# Patient Record
Sex: FEMALE | Race: WHITE | NOT HISPANIC OR LATINO | URBAN - METROPOLITAN AREA
[De-identification: names, ages, dates, MRNs, and addresses within clinical notes are randomized per-mention and may not be internally consistent; named-entity substitution may affect disease eponyms.]

---

## 2023-12-10 VITALS
SYSTOLIC BLOOD PRESSURE: 126 MMHG | WEIGHT: 130.07 LBS | RESPIRATION RATE: 18 BRPM | TEMPERATURE: 98 F | OXYGEN SATURATION: 98 % | HEART RATE: 87 BPM | DIASTOLIC BLOOD PRESSURE: 88 MMHG

## 2023-12-10 LAB
ANION GAP SERPL CALC-SCNC: 11 MMOL/L — SIGNIFICANT CHANGE UP (ref 5–17)
ANION GAP SERPL CALC-SCNC: 11 MMOL/L — SIGNIFICANT CHANGE UP (ref 5–17)
BASE EXCESS BLDV CALC-SCNC: 5.8 MMOL/L — HIGH (ref -2–3)
BASE EXCESS BLDV CALC-SCNC: 5.8 MMOL/L — HIGH (ref -2–3)
BASOPHILS # BLD AUTO: 0.02 K/UL — SIGNIFICANT CHANGE UP (ref 0–0.2)
BASOPHILS # BLD AUTO: 0.02 K/UL — SIGNIFICANT CHANGE UP (ref 0–0.2)
BASOPHILS NFR BLD AUTO: 0.4 % — SIGNIFICANT CHANGE UP (ref 0–2)
BASOPHILS NFR BLD AUTO: 0.4 % — SIGNIFICANT CHANGE UP (ref 0–2)
BUN SERPL-MCNC: 9 MG/DL — SIGNIFICANT CHANGE UP (ref 7–23)
BUN SERPL-MCNC: 9 MG/DL — SIGNIFICANT CHANGE UP (ref 7–23)
CA-I SERPL-SCNC: 1.14 MMOL/L — LOW (ref 1.15–1.33)
CA-I SERPL-SCNC: 1.14 MMOL/L — LOW (ref 1.15–1.33)
CALCIUM SERPL-MCNC: 9.2 MG/DL — SIGNIFICANT CHANGE UP (ref 8.4–10.5)
CALCIUM SERPL-MCNC: 9.2 MG/DL — SIGNIFICANT CHANGE UP (ref 8.4–10.5)
CHLORIDE SERPL-SCNC: 92 MMOL/L — LOW (ref 96–108)
CHLORIDE SERPL-SCNC: 92 MMOL/L — LOW (ref 96–108)
CO2 BLDV-SCNC: 33.2 MMOL/L — HIGH (ref 22–26)
CO2 BLDV-SCNC: 33.2 MMOL/L — HIGH (ref 22–26)
CO2 SERPL-SCNC: 27 MMOL/L — SIGNIFICANT CHANGE UP (ref 22–31)
CO2 SERPL-SCNC: 27 MMOL/L — SIGNIFICANT CHANGE UP (ref 22–31)
CREAT SERPL-MCNC: 0.66 MG/DL — SIGNIFICANT CHANGE UP (ref 0.5–1.3)
CREAT SERPL-MCNC: 0.66 MG/DL — SIGNIFICANT CHANGE UP (ref 0.5–1.3)
D DIMER BLD IA.RAPID-MCNC: 412 NG/ML DDU — HIGH
D DIMER BLD IA.RAPID-MCNC: 412 NG/ML DDU — HIGH
EGFR: 93 ML/MIN/1.73M2 — SIGNIFICANT CHANGE UP
EGFR: 93 ML/MIN/1.73M2 — SIGNIFICANT CHANGE UP
EOSINOPHIL # BLD AUTO: 0.16 K/UL — SIGNIFICANT CHANGE UP (ref 0–0.5)
EOSINOPHIL # BLD AUTO: 0.16 K/UL — SIGNIFICANT CHANGE UP (ref 0–0.5)
EOSINOPHIL NFR BLD AUTO: 2.8 % — SIGNIFICANT CHANGE UP (ref 0–6)
EOSINOPHIL NFR BLD AUTO: 2.8 % — SIGNIFICANT CHANGE UP (ref 0–6)
FLUAV AG NPH QL: DETECTED
FLUAV AG NPH QL: DETECTED
FLUBV AG NPH QL: SIGNIFICANT CHANGE UP
FLUBV AG NPH QL: SIGNIFICANT CHANGE UP
GAS PNL BLDV: 126 MMOL/L — LOW (ref 136–145)
GAS PNL BLDV: 126 MMOL/L — LOW (ref 136–145)
GAS PNL BLDV: SIGNIFICANT CHANGE UP
GLUCOSE SERPL-MCNC: 104 MG/DL — HIGH (ref 70–99)
GLUCOSE SERPL-MCNC: 104 MG/DL — HIGH (ref 70–99)
HCO3 BLDV-SCNC: 32 MMOL/L — HIGH (ref 22–29)
HCO3 BLDV-SCNC: 32 MMOL/L — HIGH (ref 22–29)
HCT VFR BLD CALC: 35.7 % — SIGNIFICANT CHANGE UP (ref 34.5–45)
HCT VFR BLD CALC: 35.7 % — SIGNIFICANT CHANGE UP (ref 34.5–45)
HGB BLD-MCNC: 12.4 G/DL — SIGNIFICANT CHANGE UP (ref 11.5–15.5)
HGB BLD-MCNC: 12.4 G/DL — SIGNIFICANT CHANGE UP (ref 11.5–15.5)
IMM GRANULOCYTES NFR BLD AUTO: 0.4 % — SIGNIFICANT CHANGE UP (ref 0–0.9)
IMM GRANULOCYTES NFR BLD AUTO: 0.4 % — SIGNIFICANT CHANGE UP (ref 0–0.9)
LYMPHOCYTES # BLD AUTO: 0.56 K/UL — LOW (ref 1–3.3)
LYMPHOCYTES # BLD AUTO: 0.56 K/UL — LOW (ref 1–3.3)
LYMPHOCYTES # BLD AUTO: 9.9 % — LOW (ref 13–44)
LYMPHOCYTES # BLD AUTO: 9.9 % — LOW (ref 13–44)
MCHC RBC-ENTMCNC: 32.3 PG — SIGNIFICANT CHANGE UP (ref 27–34)
MCHC RBC-ENTMCNC: 32.3 PG — SIGNIFICANT CHANGE UP (ref 27–34)
MCHC RBC-ENTMCNC: 34.7 GM/DL — SIGNIFICANT CHANGE UP (ref 32–36)
MCHC RBC-ENTMCNC: 34.7 GM/DL — SIGNIFICANT CHANGE UP (ref 32–36)
MCV RBC AUTO: 93 FL — SIGNIFICANT CHANGE UP (ref 80–100)
MCV RBC AUTO: 93 FL — SIGNIFICANT CHANGE UP (ref 80–100)
MONOCYTES # BLD AUTO: 0.68 K/UL — SIGNIFICANT CHANGE UP (ref 0–0.9)
MONOCYTES # BLD AUTO: 0.68 K/UL — SIGNIFICANT CHANGE UP (ref 0–0.9)
MONOCYTES NFR BLD AUTO: 12 % — SIGNIFICANT CHANGE UP (ref 2–14)
MONOCYTES NFR BLD AUTO: 12 % — SIGNIFICANT CHANGE UP (ref 2–14)
NEUTROPHILS # BLD AUTO: 4.22 K/UL — SIGNIFICANT CHANGE UP (ref 1.8–7.4)
NEUTROPHILS # BLD AUTO: 4.22 K/UL — SIGNIFICANT CHANGE UP (ref 1.8–7.4)
NEUTROPHILS NFR BLD AUTO: 74.5 % — SIGNIFICANT CHANGE UP (ref 43–77)
NEUTROPHILS NFR BLD AUTO: 74.5 % — SIGNIFICANT CHANGE UP (ref 43–77)
NRBC # BLD: 0 /100 WBCS — SIGNIFICANT CHANGE UP (ref 0–0)
NRBC # BLD: 0 /100 WBCS — SIGNIFICANT CHANGE UP (ref 0–0)
NT-PROBNP SERPL-SCNC: 290 PG/ML — SIGNIFICANT CHANGE UP (ref 0–300)
NT-PROBNP SERPL-SCNC: 290 PG/ML — SIGNIFICANT CHANGE UP (ref 0–300)
PCO2 BLDV: 50 MMHG — HIGH (ref 39–42)
PCO2 BLDV: 50 MMHG — HIGH (ref 39–42)
PH BLDV: 7.41 — SIGNIFICANT CHANGE UP (ref 7.32–7.43)
PH BLDV: 7.41 — SIGNIFICANT CHANGE UP (ref 7.32–7.43)
PLATELET # BLD AUTO: 351 K/UL — SIGNIFICANT CHANGE UP (ref 150–400)
PLATELET # BLD AUTO: 351 K/UL — SIGNIFICANT CHANGE UP (ref 150–400)
PO2 BLDV: <33 MMHG — SIGNIFICANT CHANGE UP (ref 25–45)
PO2 BLDV: <33 MMHG — SIGNIFICANT CHANGE UP (ref 25–45)
POTASSIUM BLDV-SCNC: 4.3 MMOL/L — SIGNIFICANT CHANGE UP (ref 3.5–5.1)
POTASSIUM BLDV-SCNC: 4.3 MMOL/L — SIGNIFICANT CHANGE UP (ref 3.5–5.1)
POTASSIUM SERPL-MCNC: 4.2 MMOL/L — SIGNIFICANT CHANGE UP (ref 3.5–5.3)
POTASSIUM SERPL-MCNC: 4.2 MMOL/L — SIGNIFICANT CHANGE UP (ref 3.5–5.3)
POTASSIUM SERPL-SCNC: 4.2 MMOL/L — SIGNIFICANT CHANGE UP (ref 3.5–5.3)
POTASSIUM SERPL-SCNC: 4.2 MMOL/L — SIGNIFICANT CHANGE UP (ref 3.5–5.3)
RBC # BLD: 3.84 M/UL — SIGNIFICANT CHANGE UP (ref 3.8–5.2)
RBC # BLD: 3.84 M/UL — SIGNIFICANT CHANGE UP (ref 3.8–5.2)
RBC # FLD: 12.6 % — SIGNIFICANT CHANGE UP (ref 10.3–14.5)
RBC # FLD: 12.6 % — SIGNIFICANT CHANGE UP (ref 10.3–14.5)
RSV RNA NPH QL NAA+NON-PROBE: SIGNIFICANT CHANGE UP
RSV RNA NPH QL NAA+NON-PROBE: SIGNIFICANT CHANGE UP
SAO2 % BLDV: 24.3 % — LOW (ref 67–88)
SAO2 % BLDV: 24.3 % — LOW (ref 67–88)
SARS-COV-2 RNA SPEC QL NAA+PROBE: SIGNIFICANT CHANGE UP
SARS-COV-2 RNA SPEC QL NAA+PROBE: SIGNIFICANT CHANGE UP
SODIUM SERPL-SCNC: 130 MMOL/L — LOW (ref 135–145)
SODIUM SERPL-SCNC: 130 MMOL/L — LOW (ref 135–145)
TROPONIN T, HIGH SENSITIVITY RESULT: 7 NG/L — SIGNIFICANT CHANGE UP (ref 0–51)
TROPONIN T, HIGH SENSITIVITY RESULT: 7 NG/L — SIGNIFICANT CHANGE UP (ref 0–51)
WBC # BLD: 5.66 K/UL — SIGNIFICANT CHANGE UP (ref 3.8–10.5)
WBC # BLD: 5.66 K/UL — SIGNIFICANT CHANGE UP (ref 3.8–10.5)
WBC # FLD AUTO: 5.66 K/UL — SIGNIFICANT CHANGE UP (ref 3.8–10.5)
WBC # FLD AUTO: 5.66 K/UL — SIGNIFICANT CHANGE UP (ref 3.8–10.5)

## 2023-12-10 PROCEDURE — 99285 EMERGENCY DEPT VISIT HI MDM: CPT

## 2023-12-10 PROCEDURE — G1004: CPT

## 2023-12-10 PROCEDURE — 71275 CT ANGIOGRAPHY CHEST: CPT | Mod: 26,ME

## 2023-12-10 PROCEDURE — 71046 X-RAY EXAM CHEST 2 VIEWS: CPT | Mod: 26

## 2023-12-10 RX ORDER — PIPERACILLIN AND TAZOBACTAM 4; .5 G/20ML; G/20ML
3.38 INJECTION, POWDER, LYOPHILIZED, FOR SOLUTION INTRAVENOUS ONCE
Refills: 0 | Status: COMPLETED | OUTPATIENT
Start: 2023-12-10 | End: 2023-12-10

## 2023-12-10 RX ORDER — IPRATROPIUM/ALBUTEROL SULFATE 18-103MCG
3 AEROSOL WITH ADAPTER (GRAM) INHALATION ONCE
Refills: 0 | Status: COMPLETED | OUTPATIENT
Start: 2023-12-10 | End: 2023-12-10

## 2023-12-10 RX ORDER — VANCOMYCIN HCL 1 G
1000 VIAL (EA) INTRAVENOUS ONCE
Refills: 0 | Status: COMPLETED | OUTPATIENT
Start: 2023-12-10 | End: 2023-12-10

## 2023-12-10 RX ORDER — SODIUM CHLORIDE 9 MG/ML
1000 INJECTION INTRAMUSCULAR; INTRAVENOUS; SUBCUTANEOUS ONCE
Refills: 0 | Status: COMPLETED | OUTPATIENT
Start: 2023-12-10 | End: 2023-12-10

## 2023-12-10 RX ADMIN — Medication 250 MILLIGRAM(S): at 23:58

## 2023-12-10 RX ADMIN — Medication 3 MILLILITER(S): at 20:14

## 2023-12-10 RX ADMIN — SODIUM CHLORIDE 1000 MILLILITER(S): 9 INJECTION INTRAMUSCULAR; INTRAVENOUS; SUBCUTANEOUS at 20:52

## 2023-12-10 NOTE — ED PROVIDER NOTE - OBJECTIVE STATEMENT
74 yo fem without signif pmhx visiting from [place]; came to [place] via [place] apx 3 weeks ago; in past 2 weeks developed cough productive of yellow-green sputum 74 yo fem without signif pmhx visiting from [place]; came to [place] via [place] apx 3 weeks ago; in past 2 weeks developed cough productive of yellow-green sputum 74 yo fem without signif pmhx visiting from [place]; came to [place] via [place] apx 3 weeks ago; in past 2 weeks developed cough productive of yellow-green sputum; seen 1 week ago at [facility] where CXR showed: hyperinflation, ? "nodularity infiltrates"in RLL that could represent atypical pneumonia, bronchial thickening; no focal consolidation, effusion or ptx. tortuous ectatic aorta, degen spine change, pectus excavatum, 5 mm AYANNA nodule; CT scan was recommended upon her return to [place]. She has been on amoxicillin and azithromycin since then. Returned to [facility] today due to persistent symptoms, and increasing fatigue, more painful cough; having difficulty expectorating sputum in the past day.   states he can hear rattling from her chest at night in bed. Today at [facility] she reports having a fever of 102, and she tested positive for influenza A; she was referred to the ED due to hypoxia to 91%.  No hx of DVT/PE, heart disease, asthma/COPD/other chronic lung disease, HTN, HLD, DM.    PMHx denies  Meds amoxicillin, azithromycin  NKDA 72 yo fem without signif pmhx visiting from [place]; [place] via [place] ap[place] 3 weeks ago; in past 2 weeks developed cough productive of yellow-green sputum; seen 1 week ago at [facility] where CXR showed: hyperinflation, ? "nodularity infiltrates"in RLL that could represent atypical pneumonia, bronchial thickening; no focal consolidation, effusion or ptx. tortuous ectatic aorta, degen spine change, pectus excavatum, 5 mm AYANNA nodule; CT scan was recommended upon her return to [place]. She has been on amoxicillin and azithromycin since then. Returned to [facility] today due to persistent symptoms, and increasing fatigue, more painful cough; having difficulty expectorating sputum in the past day.   states he can hear rattling from her chest at night in bed. Today at [facility] she reports having a fever of 102, and she tested positive for influenza A; she was referred to the ED due to hypoxia to 91%.  No hx of DVT/PE, heart disease, asthma/COPD/other chronic lung disease, HTN, HLD, DM.    PMHx denies  Meds amoxicillin, azithromycin  NKDA

## 2023-12-10 NOTE — ED PROVIDER NOTE - NS ED ATTENDING STATEMENT MOD
This was a shared visit with the JASPAL. I reviewed and verified the documentation and independently performed the documented:

## 2023-12-10 NOTE — ED ADULT TRIAGE NOTE - CHIEF COMPLAINT QUOTE
Pt co productive cough x2 weeks. Finished course of PO Amoxicillin and Azithromycin without relief of sx.

## 2023-12-10 NOTE — ED ADULT NURSE REASSESSMENT NOTE - NS ED NURSE REASSESS COMMENT FT1
pt received alert and oriented x4, breathing at ease on room air, no complaints at this time, no distress noted, pt medicated as per order, ongoing monitoring

## 2023-12-10 NOTE — ED ADULT NURSE NOTE - NSFALLUNIVINTERV_ED_ALL_ED
Bed/Stretcher in lowest position, wheels locked, appropriate side rails in place/Call bell, personal items and telephone in reach/Instruct patient to call for assistance before getting out of bed/chair/stretcher/Non-slip footwear applied when patient is off stretcher/[place] to call system/Physically safe environment - no spills, clutter or unnecessary equipment/Purposeful proactive rounding/Room/bathroom lighting operational, light cord in reach Bed/Stretcher in lowest position, wheels locked, appropriate side rails in place/Call bell, personal items and telephone in reach/Instruct patient to call for assistance before getting out of bed/chair/stretcher/Non-slip footwear applied when patient is off stretcher/[place] to call system/Physically safe environment - no spills, clutter or unnecessary equipment/Purposeful proactive rounding/Room/bathroom lighting operational, light cord in reach

## 2023-12-10 NOTE — ED PROVIDER NOTE - CLINICAL SUMMARY MEDICAL DECISION MAKING FREE TEXT BOX
2 weeks cough, now with fever and CISNEROS. Fever 102 earlier today. Tested + for Flu A.  Mildly hypoxic to 91-93% on room air.  HD stable.  Tx empirically with zosyn & vanco as not improving with PO abx.  Imaging in ED significant for LLL bronchus obstruction, with atelectasis of majority of LLL, consider mucous plug vs neoplasm.  Admit to medicine.

## 2023-12-10 NOTE — ED PROVIDER NOTE - NS ED MD DISPO ISOLATION TYPES
5-year-old female with a history of wheezing here for increased work of breathing.  RSS of 9.  We will give 3 treatments, Decadron and reassess
Droplet

## 2023-12-10 NOTE — ED PROVIDER NOTE - PHYSICAL EXAMINATION
CONSTITUTIONAL: NAD   SKIN: Normal color and turgor.    HEAD: NC/AT.  EYES: Conjunctiva clear. Anicteric sclera.  ENT: Airway clear. Normal voice.   RESPIRATORY:  Normal work of breathing. Lungs CTAB.  CARDIOVASCULAR:  RRR, S1S2. No M/R/G.      GI:  Abdomen soft, nontender.    MSK: Neck supple.  No LE edema or calf tenderness. No joint swelling or ROM limitation.  NEURO: Alert; CN: grossly intact. Speech clear.  MCCONNELL. Gait steady.    Ambulatory O2 sat in ED: 91-93% on room air

## 2023-12-10 NOTE — ED ADULT NURSE NOTE - OBJECTIVE STATEMENT
72 y/o F presents to ED, c/o productive cough with green/yellow sputum for approx 2 weeks. 74 y/o F presents to ED, c/o productive cough with green/yellow sputum for approx 2 weeks.

## 2023-12-11 ENCOUNTER — INPATIENT (INPATIENT)
Facility: HOSPITAL | Age: 73
LOS: 0 days | Discharge: HOME | DRG: 194 | End: 2023-12-12
Attending: INTERNAL MEDICINE | Admitting: STUDENT IN AN ORGANIZED HEALTH CARE EDUCATION/TRAINING PROGRAM
Payer: COMMERCIAL

## 2023-12-11 DIAGNOSIS — E87.1 HYPO-OSMOLALITY AND HYPONATREMIA: ICD-10-CM

## 2023-12-11 DIAGNOSIS — Z29.9 ENCOUNTER FOR PROPHYLACTIC MEASURES, UNSPECIFIED: ICD-10-CM

## 2023-12-11 DIAGNOSIS — J18.9 PNEUMONIA, UNSPECIFIED ORGANISM: ICD-10-CM

## 2023-12-11 DIAGNOSIS — J10.1 INFLUENZA DUE TO OTHER IDENTIFIED INFLUENZA VIRUS WITH OTHER RESPIRATORY MANIFESTATIONS: ICD-10-CM

## 2023-12-11 LAB
ALBUMIN SERPL ELPH-MCNC: 3.2 G/DL — LOW (ref 3.3–5)
ALBUMIN SERPL ELPH-MCNC: 3.2 G/DL — LOW (ref 3.3–5)
ALBUMIN SERPL ELPH-MCNC: 3.7 G/DL — SIGNIFICANT CHANGE UP (ref 3.3–5)
ALBUMIN SERPL ELPH-MCNC: 3.7 G/DL — SIGNIFICANT CHANGE UP (ref 3.3–5)
ALP SERPL-CCNC: 66 U/L — SIGNIFICANT CHANGE UP (ref 40–120)
ALP SERPL-CCNC: 66 U/L — SIGNIFICANT CHANGE UP (ref 40–120)
ALP SERPL-CCNC: 74 U/L — SIGNIFICANT CHANGE UP (ref 40–120)
ALP SERPL-CCNC: 74 U/L — SIGNIFICANT CHANGE UP (ref 40–120)
ALT FLD-CCNC: 12 U/L — SIGNIFICANT CHANGE UP (ref 10–45)
ALT FLD-CCNC: 12 U/L — SIGNIFICANT CHANGE UP (ref 10–45)
ALT FLD-CCNC: 8 U/L — LOW (ref 10–45)
ALT FLD-CCNC: 8 U/L — LOW (ref 10–45)
ANION GAP SERPL CALC-SCNC: 9 MMOL/L — SIGNIFICANT CHANGE UP (ref 5–17)
ANION GAP SERPL CALC-SCNC: 9 MMOL/L — SIGNIFICANT CHANGE UP (ref 5–17)
APTT BLD: 35.7 SEC — HIGH (ref 24.5–35.6)
APTT BLD: 35.7 SEC — HIGH (ref 24.5–35.6)
AST SERPL-CCNC: 24 U/L — SIGNIFICANT CHANGE UP (ref 10–40)
AST SERPL-CCNC: 24 U/L — SIGNIFICANT CHANGE UP (ref 10–40)
AST SERPL-CCNC: 33 U/L — SIGNIFICANT CHANGE UP (ref 10–40)
AST SERPL-CCNC: 33 U/L — SIGNIFICANT CHANGE UP (ref 10–40)
BASOPHILS # BLD AUTO: 0.02 K/UL — SIGNIFICANT CHANGE UP (ref 0–0.2)
BASOPHILS # BLD AUTO: 0.02 K/UL — SIGNIFICANT CHANGE UP (ref 0–0.2)
BASOPHILS NFR BLD AUTO: 0.5 % — SIGNIFICANT CHANGE UP (ref 0–2)
BASOPHILS NFR BLD AUTO: 0.5 % — SIGNIFICANT CHANGE UP (ref 0–2)
BILIRUB DIRECT SERPL-MCNC: <0.2 MG/DL — SIGNIFICANT CHANGE UP (ref 0–0.3)
BILIRUB DIRECT SERPL-MCNC: <0.2 MG/DL — SIGNIFICANT CHANGE UP (ref 0–0.3)
BILIRUB INDIRECT FLD-MCNC: SIGNIFICANT CHANGE UP MG/DL (ref 0.2–1)
BILIRUB INDIRECT FLD-MCNC: SIGNIFICANT CHANGE UP MG/DL (ref 0.2–1)
BILIRUB SERPL-MCNC: 0.2 MG/DL — SIGNIFICANT CHANGE UP (ref 0.2–1.2)
BILIRUB SERPL-MCNC: 0.2 MG/DL — SIGNIFICANT CHANGE UP (ref 0.2–1.2)
BILIRUB SERPL-MCNC: 0.3 MG/DL — SIGNIFICANT CHANGE UP (ref 0.2–1.2)
BILIRUB SERPL-MCNC: 0.3 MG/DL — SIGNIFICANT CHANGE UP (ref 0.2–1.2)
BUN SERPL-MCNC: 6 MG/DL — LOW (ref 7–23)
BUN SERPL-MCNC: 6 MG/DL — LOW (ref 7–23)
CALCIUM SERPL-MCNC: 8.8 MG/DL — SIGNIFICANT CHANGE UP (ref 8.4–10.5)
CALCIUM SERPL-MCNC: 8.8 MG/DL — SIGNIFICANT CHANGE UP (ref 8.4–10.5)
CHLORIDE SERPL-SCNC: 96 MMOL/L — SIGNIFICANT CHANGE UP (ref 96–108)
CHLORIDE SERPL-SCNC: 96 MMOL/L — SIGNIFICANT CHANGE UP (ref 96–108)
CO2 SERPL-SCNC: 27 MMOL/L — SIGNIFICANT CHANGE UP (ref 22–31)
CO2 SERPL-SCNC: 27 MMOL/L — SIGNIFICANT CHANGE UP (ref 22–31)
CREAT SERPL-MCNC: 0.52 MG/DL — SIGNIFICANT CHANGE UP (ref 0.5–1.3)
CREAT SERPL-MCNC: 0.52 MG/DL — SIGNIFICANT CHANGE UP (ref 0.5–1.3)
EGFR: 99 ML/MIN/1.73M2 — SIGNIFICANT CHANGE UP
EGFR: 99 ML/MIN/1.73M2 — SIGNIFICANT CHANGE UP
EOSINOPHIL # BLD AUTO: 0 K/UL — SIGNIFICANT CHANGE UP (ref 0–0.5)
EOSINOPHIL # BLD AUTO: 0 K/UL — SIGNIFICANT CHANGE UP (ref 0–0.5)
EOSINOPHIL NFR BLD AUTO: 0 % — SIGNIFICANT CHANGE UP (ref 0–6)
EOSINOPHIL NFR BLD AUTO: 0 % — SIGNIFICANT CHANGE UP (ref 0–6)
FLUAV H1 2009 PAND RNA SPEC QL NAA+PROBE: DETECTED
FLUAV H1 2009 PAND RNA SPEC QL NAA+PROBE: DETECTED
GLUCOSE SERPL-MCNC: 96 MG/DL — SIGNIFICANT CHANGE UP (ref 70–99)
GLUCOSE SERPL-MCNC: 96 MG/DL — SIGNIFICANT CHANGE UP (ref 70–99)
HCT VFR BLD CALC: 35.1 % — SIGNIFICANT CHANGE UP (ref 34.5–45)
HCT VFR BLD CALC: 35.1 % — SIGNIFICANT CHANGE UP (ref 34.5–45)
HCV AB S/CO SERPL IA: 0.08 S/CO — SIGNIFICANT CHANGE UP
HCV AB S/CO SERPL IA: 0.08 S/CO — SIGNIFICANT CHANGE UP
HCV AB SERPL-IMP: SIGNIFICANT CHANGE UP
HCV AB SERPL-IMP: SIGNIFICANT CHANGE UP
HGB BLD-MCNC: 11.8 G/DL — SIGNIFICANT CHANGE UP (ref 11.5–15.5)
HGB BLD-MCNC: 11.8 G/DL — SIGNIFICANT CHANGE UP (ref 11.5–15.5)
IMM GRANULOCYTES NFR BLD AUTO: 0.5 % — SIGNIFICANT CHANGE UP (ref 0–0.9)
IMM GRANULOCYTES NFR BLD AUTO: 0.5 % — SIGNIFICANT CHANGE UP (ref 0–0.9)
INR BLD: 1.18 — SIGNIFICANT CHANGE UP (ref 0.85–1.18)
INR BLD: 1.18 — SIGNIFICANT CHANGE UP (ref 0.85–1.18)
LYMPHOCYTES # BLD AUTO: 0.71 K/UL — LOW (ref 1–3.3)
LYMPHOCYTES # BLD AUTO: 0.71 K/UL — LOW (ref 1–3.3)
LYMPHOCYTES # BLD AUTO: 16.9 % — SIGNIFICANT CHANGE UP (ref 13–44)
LYMPHOCYTES # BLD AUTO: 16.9 % — SIGNIFICANT CHANGE UP (ref 13–44)
MAGNESIUM SERPL-MCNC: 2 MG/DL — SIGNIFICANT CHANGE UP (ref 1.6–2.6)
MAGNESIUM SERPL-MCNC: 2 MG/DL — SIGNIFICANT CHANGE UP (ref 1.6–2.6)
MCHC RBC-ENTMCNC: 32 PG — SIGNIFICANT CHANGE UP (ref 27–34)
MCHC RBC-ENTMCNC: 32 PG — SIGNIFICANT CHANGE UP (ref 27–34)
MCHC RBC-ENTMCNC: 33.6 GM/DL — SIGNIFICANT CHANGE UP (ref 32–36)
MCHC RBC-ENTMCNC: 33.6 GM/DL — SIGNIFICANT CHANGE UP (ref 32–36)
MCV RBC AUTO: 95.1 FL — SIGNIFICANT CHANGE UP (ref 80–100)
MCV RBC AUTO: 95.1 FL — SIGNIFICANT CHANGE UP (ref 80–100)
MONOCYTES # BLD AUTO: 0.58 K/UL — SIGNIFICANT CHANGE UP (ref 0–0.9)
MONOCYTES # BLD AUTO: 0.58 K/UL — SIGNIFICANT CHANGE UP (ref 0–0.9)
MONOCYTES NFR BLD AUTO: 13.8 % — SIGNIFICANT CHANGE UP (ref 2–14)
MONOCYTES NFR BLD AUTO: 13.8 % — SIGNIFICANT CHANGE UP (ref 2–14)
NEUTROPHILS # BLD AUTO: 2.87 K/UL — SIGNIFICANT CHANGE UP (ref 1.8–7.4)
NEUTROPHILS # BLD AUTO: 2.87 K/UL — SIGNIFICANT CHANGE UP (ref 1.8–7.4)
NEUTROPHILS NFR BLD AUTO: 68.3 % — SIGNIFICANT CHANGE UP (ref 43–77)
NEUTROPHILS NFR BLD AUTO: 68.3 % — SIGNIFICANT CHANGE UP (ref 43–77)
NRBC # BLD: 0 /100 WBCS — SIGNIFICANT CHANGE UP (ref 0–0)
NRBC # BLD: 0 /100 WBCS — SIGNIFICANT CHANGE UP (ref 0–0)
PHOSPHATE SERPL-MCNC: 3 MG/DL — SIGNIFICANT CHANGE UP (ref 2.5–4.5)
PHOSPHATE SERPL-MCNC: 3 MG/DL — SIGNIFICANT CHANGE UP (ref 2.5–4.5)
PLATELET # BLD AUTO: 318 K/UL — SIGNIFICANT CHANGE UP (ref 150–400)
PLATELET # BLD AUTO: 318 K/UL — SIGNIFICANT CHANGE UP (ref 150–400)
POTASSIUM SERPL-MCNC: 3.9 MMOL/L — SIGNIFICANT CHANGE UP (ref 3.5–5.3)
POTASSIUM SERPL-MCNC: 3.9 MMOL/L — SIGNIFICANT CHANGE UP (ref 3.5–5.3)
POTASSIUM SERPL-SCNC: 3.9 MMOL/L — SIGNIFICANT CHANGE UP (ref 3.5–5.3)
POTASSIUM SERPL-SCNC: 3.9 MMOL/L — SIGNIFICANT CHANGE UP (ref 3.5–5.3)
PROCALCITONIN SERPL-MCNC: 0.06 NG/ML — SIGNIFICANT CHANGE UP (ref 0.02–0.1)
PROCALCITONIN SERPL-MCNC: 0.06 NG/ML — SIGNIFICANT CHANGE UP (ref 0.02–0.1)
PROT SERPL-MCNC: 6.1 G/DL — SIGNIFICANT CHANGE UP (ref 6–8.3)
PROT SERPL-MCNC: 6.1 G/DL — SIGNIFICANT CHANGE UP (ref 6–8.3)
PROT SERPL-MCNC: 6.8 G/DL — SIGNIFICANT CHANGE UP (ref 6–8.3)
PROT SERPL-MCNC: 6.8 G/DL — SIGNIFICANT CHANGE UP (ref 6–8.3)
PROTHROM AB SERPL-ACNC: 13.4 SEC — HIGH (ref 9.5–13)
PROTHROM AB SERPL-ACNC: 13.4 SEC — HIGH (ref 9.5–13)
RAPID RVP RESULT: DETECTED
RAPID RVP RESULT: DETECTED
RBC # BLD: 3.69 M/UL — LOW (ref 3.8–5.2)
RBC # BLD: 3.69 M/UL — LOW (ref 3.8–5.2)
RBC # FLD: 12.7 % — SIGNIFICANT CHANGE UP (ref 10.3–14.5)
RBC # FLD: 12.7 % — SIGNIFICANT CHANGE UP (ref 10.3–14.5)
RVP NOTIFY: SIGNIFICANT CHANGE UP
RVP NOTIFY: SIGNIFICANT CHANGE UP
S PNEUM AG UR QL: NEGATIVE — SIGNIFICANT CHANGE UP
S PNEUM AG UR QL: NEGATIVE — SIGNIFICANT CHANGE UP
SARS-COV-2 RNA SPEC QL NAA+PROBE: SIGNIFICANT CHANGE UP
SARS-COV-2 RNA SPEC QL NAA+PROBE: SIGNIFICANT CHANGE UP
SODIUM SERPL-SCNC: 132 MMOL/L — LOW (ref 135–145)
SODIUM SERPL-SCNC: 132 MMOL/L — LOW (ref 135–145)
WBC # BLD: 4.2 K/UL — SIGNIFICANT CHANGE UP (ref 3.8–10.5)
WBC # BLD: 4.2 K/UL — SIGNIFICANT CHANGE UP (ref 3.8–10.5)
WBC # FLD AUTO: 4.2 K/UL — SIGNIFICANT CHANGE UP (ref 3.8–10.5)
WBC # FLD AUTO: 4.2 K/UL — SIGNIFICANT CHANGE UP (ref 3.8–10.5)

## 2023-12-11 PROCEDURE — 99233 SBSQ HOSP IP/OBS HIGH 50: CPT | Mod: GC

## 2023-12-11 RX ORDER — IPRATROPIUM/ALBUTEROL SULFATE 18-103MCG
3 AEROSOL WITH ADAPTER (GRAM) INHALATION EVERY 6 HOURS
Refills: 0 | Status: DISCONTINUED | OUTPATIENT
Start: 2023-12-11 | End: 2023-12-12

## 2023-12-11 RX ORDER — SODIUM CHLORIDE 9 MG/ML
4 INJECTION INTRAMUSCULAR; INTRAVENOUS; SUBCUTANEOUS EVERY 12 HOURS
Refills: 0 | Status: DISCONTINUED | OUTPATIENT
Start: 2023-12-11 | End: 2023-12-12

## 2023-12-11 RX ORDER — ACETAMINOPHEN 500 MG
650 TABLET ORAL EVERY 6 HOURS
Refills: 0 | Status: DISCONTINUED | OUTPATIENT
Start: 2023-12-11 | End: 2023-12-12

## 2023-12-11 RX ORDER — ENOXAPARIN SODIUM 100 MG/ML
40 INJECTION SUBCUTANEOUS EVERY 24 HOURS
Refills: 0 | Status: DISCONTINUED | OUTPATIENT
Start: 2023-12-11 | End: 2023-12-12

## 2023-12-11 RX ORDER — CEFTRIAXONE 500 MG/1
1000 INJECTION, POWDER, FOR SOLUTION INTRAMUSCULAR; INTRAVENOUS EVERY 24 HOURS
Refills: 0 | Status: DISCONTINUED | OUTPATIENT
Start: 2023-12-11 | End: 2023-12-12

## 2023-12-11 RX ORDER — AZITHROMYCIN 500 MG/1
500 TABLET, FILM COATED ORAL EVERY 24 HOURS
Refills: 0 | Status: DISCONTINUED | OUTPATIENT
Start: 2023-12-11 | End: 2023-12-12

## 2023-12-11 RX ADMIN — CEFTRIAXONE 100 MILLIGRAM(S): 500 INJECTION, POWDER, FOR SOLUTION INTRAMUSCULAR; INTRAVENOUS at 06:56

## 2023-12-11 RX ADMIN — Medication 3 MILLILITER(S): at 10:12

## 2023-12-11 RX ADMIN — SODIUM CHLORIDE 4 MILLILITER(S): 9 INJECTION INTRAMUSCULAR; INTRAVENOUS; SUBCUTANEOUS at 17:54

## 2023-12-11 RX ADMIN — ENOXAPARIN SODIUM 40 MILLIGRAM(S): 100 INJECTION SUBCUTANEOUS at 06:55

## 2023-12-11 RX ADMIN — Medication 650 MILLIGRAM(S): at 14:32

## 2023-12-11 RX ADMIN — Medication 3 MILLILITER(S): at 21:27

## 2023-12-11 RX ADMIN — Medication 1000 MILLIGRAM(S): at 00:58

## 2023-12-11 RX ADMIN — PIPERACILLIN AND TAZOBACTAM 200 GRAM(S): 4; .5 INJECTION, POWDER, LYOPHILIZED, FOR SOLUTION INTRAVENOUS at 00:01

## 2023-12-11 RX ADMIN — Medication 75 MILLIGRAM(S): at 17:55

## 2023-12-11 RX ADMIN — Medication 75 MILLIGRAM(S): at 06:58

## 2023-12-11 RX ADMIN — PIPERACILLIN AND TAZOBACTAM 3.38 GRAM(S): 4; .5 INJECTION, POWDER, LYOPHILIZED, FOR SOLUTION INTRAVENOUS at 00:31

## 2023-12-11 RX ADMIN — Medication 600 MILLIGRAM(S): at 17:54

## 2023-12-11 RX ADMIN — AZITHROMYCIN 255 MILLIGRAM(S): 500 TABLET, FILM COATED ORAL at 06:55

## 2023-12-11 RX ADMIN — Medication 3 MILLILITER(S): at 15:22

## 2023-12-11 NOTE — PROGRESS NOTE ADULT - PROBLEM SELECTOR PLAN 4
Na 130-132. Most likely euvolemic 2/2 SIADH following CAP vs. hypovolemic.  - Monitor Serum Na  - UA studies  - Fluids Na 130-132. Most likely euvolemic 2/2 SIADH following CAP vs. hypovolemic.  - Monitor Serum Na and UA studies  - Fluids

## 2023-12-11 NOTE — H&P ADULT - PROBLEM SELECTOR PLAN 1
Patient with 2 weeks of cough and progressive weakness/fatigue. Daughter and grandchild at home with similar symptoms. Found to be Influenza A positive.   - Start Tamiflu 75mg BID x5 days

## 2023-12-11 NOTE — H&P ADULT - PROBLEM SELECTOR PLAN 4
F: s/p 1L NS  E: replete PRN  N: Regular diet  DVT ppx: Lovenox subq    Dispo: [facility] F: s/p 1L NS  E: replete PRN  N: Regular diet  DVT ppx: Lovenox subq    Dispo: [facility]

## 2023-12-11 NOTE — PATIENT PROFILE ADULT - FALL HARM RISK - UNIVERSAL INTERVENTIONS
Bed in lowest position, wheels locked, appropriate side rails in place/Call bell, personal items and telephone in reach/Instruct patient to call for assistance before getting out of bed or chair/Non-slip footwear when patient is out of bed/[place] to call system/Physically safe environment - no spills, clutter or unnecessary equipment/Purposeful Proactive Rounding/Room/bathroom lighting operational, light cord in reach Bed in lowest position, wheels locked, appropriate side rails in place/Call bell, personal items and telephone in reach/Instruct patient to call for assistance before getting out of bed or chair/Non-slip footwear when patient is out of bed/[place] to call system/Physically safe environment - no spills, clutter or unnecessary equipment/Purposeful Proactive Rounding/Room/bathroom lighting operational, light cord in reach

## 2023-12-11 NOTE — H&P ADULT - NSHPLABSRESULTS_GEN_ALL_CORE
LABS:                        12.4   5.66  )-----------( 351      ( 10 Dec 2023 19:32 )             35.7     12-10    130<L>  |  92<L>  |  9   ----------------------------<  104<H>  4.2   |  27  |  0.66    Ca    9.2      10 Dec 2023 19:32        Urinalysis Basic - ( 10 Dec 2023 19:32 )    Color: x / Appearance: x / SG: x / pH: x  Gluc: 104 mg/dL / Ketone: x  / Bili: x / Urobili: x   Blood: x / Protein: x / Nitrite: x   Leuk Esterase: x / RBC: x / WBC x   Sq Epi: x / Non Sq Epi: x / Bacteria:         RADIOLOGY & ADDITIONAL TESTS: Reviewed as above

## 2023-12-11 NOTE — PROGRESS NOTE ADULT - PROBLEM SELECTOR PLAN 3
CT chest with diffuse peribronchial thickening, LLL bronchus is obstructed with low density material, possibly mucus plugging. Likely has obstructive PNA vs atelectasis. Possibility of obstructive neoplasm but pt does not have B-symptoms or risk factors such as smoking or famhx.   - C/w Duonebs q6h  - C/w Hypersal nebs  - Start mucolytics   - Chest PT with Aerobika device  - See above plan for CAP CT chest with diffuse peribronchial thickening, LLL bronchus is obstructed with low density material, possibly mucus plugging. Likely has obstructive PNA vs atelectasis. Possibility of obstructive neoplasm d/t long duration of s/s and CT chest results. However, pt does not have B-symptoms or risk factors such as smoking or famhx.   - C/w Duonebs q6h  - C/w Hypersal nebs  - Start mucolytics   - Chest PT with Aerobika device  - Consider f/u chest CT in 6 weeks on d/c to r/o malignancy  - See above plan for CAP

## 2023-12-11 NOTE — PATIENT PROFILE ADULT - VISION (WITH CORRECTIVE LENSES IF THE PATIENT USUALLY WEARS THEM):
with reading glasses/Partially impaired: cannot see medication labels or newsprint, but can see obstacles in path, and the surrounding layout; can count fingers at arm's length

## 2023-12-11 NOTE — PROGRESS NOTE ADULT - ATTENDING COMMENTS
Patient reports feeling improving, SOB and cough improved. Planning to fly back to [place] next weelk.    Labs, imaging reviewed    Patient clinically improving, will continue on same management, chest PT. She is aware of CT findings and need to get repeat CT chest in 6 weeks, she will plan it with her physician in [place]  DVT ppx

## 2023-12-11 NOTE — PROGRESS NOTE ADULT - SUBJECTIVE AND OBJECTIVE BOX
**INCOMPLETE NOTE    OVERNIGHT EVENTS: No major events to report.    SUBJECTIVE: Patient seen and examined at bedside. Pt states she is coughing less than yesterday with pale sputum. Pt complains of SOB, mild HA, and difficulty expectorating sputum. Denied fever, chest pain, abdominal pain, n/v, or smoking hx.    Vital Signs Last 12 Hrs  T(F): 99.9 (12-11-23 @ 06:03), Max: 99.9 (12-11-23 @ 06:03)  HR: 74 (12-11-23 @ 06:03) (74 - 84)  BP: 114/72 (12-11-23 @ 06:03) (104/69 - 114/72)  BP(mean): --  RR: 19 (12-11-23 @ 06:03) (18 - 20)  SpO2: 92% (12-11-23 @ 06:03) (92% - 93%)  I&O's Summary      PHYSICAL EXAM:  Constitutional: NAD, comfortable in bed.  HEENT: NC/AT, PERRLA, EOMI, no conjunctival pallor or scleral icterus, MMM  Neck: Supple, no JVD  Respiratory: Decreased BS and mild rhonchi in LLL. No w/r.   Cardiovascular: RRR, normal S1 and S2, no m/r/g.   Gastrointestinal: +BS, soft NTND, no guarding or rebound tenderness, no palpable masses   Extremities: wwp; no cyanosis, clubbing or edema.   Vascular: Pulses equal and strong throughout.   Neurological: AAOx3, no CN deficits, strength and sensation intact throughout.   Skin: No gross skin abnormalities or rashes        LABS:                        11.8   4.20  )-----------( 318      ( 11 Dec 2023 05:30 )             35.1     12-11    132<L>  |  96  |  6<L>  ----------------------------<  96  3.9   |  27  |  0.52    Ca    8.8      11 Dec 2023 05:30  Phos  3.0     12-11  Mg     2.0     12-11    TPro  6.1  /  Alb  3.2<L>  /  TBili  0.2  /  DBili  x   /  AST  24  /  ALT  8<L>  /  AlkPhos  66  12-11    PT/INR - ( 11 Dec 2023 05:30 )   PT: 13.4 sec;   INR: 1.18          PTT - ( 11 Dec 2023 05:30 )  PTT:35.7 sec  Urinalysis Basic - ( 11 Dec 2023 05:30 )    Color: x / Appearance: x / SG: x / pH: x  Gluc: 96 mg/dL / Ketone: x  / Bili: x / Urobili: x   Blood: x / Protein: x / Nitrite: x   Leuk Esterase: x / RBC: x / WBC x   Sq Epi: x / Non Sq Epi: x / Bacteria: x          RADIOLOGY & ADDITIONAL TESTS:    MEDICATIONS  (STANDING):  albuterol/ipratropium for Nebulization 3 milliLiter(s) Nebulizer every 6 hours  azithromycin  IVPB 500 milliGRAM(s) IV Intermittent every 24 hours  cefTRIAXone   IVPB 1000 milliGRAM(s) IV Intermittent every 24 hours  enoxaparin Injectable 40 milliGRAM(s) SubCutaneous every 24 hours  guaiFENesin  milliGRAM(s) Oral every 12 hours  oseltamivir 75 milliGRAM(s) Oral two times a day  sodium chloride 7% Inhalation 4 milliLiter(s) Inhalation every 12 hours    MEDICATIONS  (PRN):  acetaminophen     Tablet .. 650 milliGRAM(s) Oral every 6 hours PRN Temp greater or equal to 38C (100.4F), Mild Pain (1 - 3)       OVERNIGHT EVENTS: No major events to report.    SUBJECTIVE: Patient seen and examined at bedside. Pt states she is coughing less than yesterday with pale sputum. Pt complains of SOB, mild HA, and difficulty expectorating sputum. Denied fever, chest pain, abdominal pain, n/v, or smoking hx.    Vital Signs Last 12 Hrs  T(F): 99.9 (12-11-23 @ 06:03), Max: 99.9 (12-11-23 @ 06:03)  HR: 74 (12-11-23 @ 06:03) (74 - 84)  BP: 114/72 (12-11-23 @ 06:03) (104/69 - 114/72)  BP(mean): --  RR: 19 (12-11-23 @ 06:03) (18 - 20)  SpO2: 92% (12-11-23 @ 06:03) (92% - 93%)  I&O's Summary      PHYSICAL EXAM:  Constitutional: NAD, comfortable in bed.  HEENT: NC/AT, PERRLA, EOMI, no conjunctival pallor or scleral icterus, MMM  Neck: Supple, no JVD  Respiratory: Decreased BS and mild rhonchi in LLL. No w/r.   Cardiovascular: RRR, normal S1 and S2, no m/r/g.   Gastrointestinal: +BS, soft NTND, no guarding or rebound tenderness, no palpable masses   Extremities: wwp; no cyanosis, clubbing or edema.   Vascular: Pulses equal and strong throughout.   Neurological: AAOx3, no CN deficits, strength and sensation intact throughout.   Skin: No gross skin abnormalities or rashes        LABS:                        11.8   4.20  )-----------( 318      ( 11 Dec 2023 05:30 )             35.1     12-11    132<L>  |  96  |  6<L>  ----------------------------<  96  3.9   |  27  |  0.52    Ca    8.8      11 Dec 2023 05:30  Phos  3.0     12-11  Mg     2.0     12-11    TPro  6.1  /  Alb  3.2<L>  /  TBili  0.2  /  DBili  x   /  AST  24  /  ALT  8<L>  /  AlkPhos  66  12-11    PT/INR - ( 11 Dec 2023 05:30 )   PT: 13.4 sec;   INR: 1.18          PTT - ( 11 Dec 2023 05:30 )  PTT:35.7 sec  Urinalysis Basic - ( 11 Dec 2023 05:30 )    Color: x / Appearance: x / SG: x / pH: x  Gluc: 96 mg/dL / Ketone: x  / Bili: x / Urobili: x   Blood: x / Protein: x / Nitrite: x   Leuk Esterase: x / RBC: x / WBC x   Sq Epi: x / Non Sq Epi: x / Bacteria: x          RADIOLOGY & ADDITIONAL TESTS:    MEDICATIONS  (STANDING):  albuterol/ipratropium for Nebulization 3 milliLiter(s) Nebulizer every 6 hours  azithromycin  IVPB 500 milliGRAM(s) IV Intermittent every 24 hours  cefTRIAXone   IVPB 1000 milliGRAM(s) IV Intermittent every 24 hours  enoxaparin Injectable 40 milliGRAM(s) SubCutaneous every 24 hours  guaiFENesin  milliGRAM(s) Oral every 12 hours  oseltamivir 75 milliGRAM(s) Oral two times a day  sodium chloride 7% Inhalation 4 milliLiter(s) Inhalation every 12 hours    MEDICATIONS  (PRN):  acetaminophen     Tablet .. 650 milliGRAM(s) Oral every 6 hours PRN Temp greater or equal to 38C (100.4F), Mild Pain (1 - 3)

## 2023-12-11 NOTE — H&P ADULT - PROBLEM SELECTOR PLAN 3
CT chest with diffuse peribronchial thickening, LLL bronchus is obstructed with low density material, possibly mucus   plugging. Likely has obstructive PNA vs atelectasis. Low suspicion for obstructive neoplasm given no B-symptoms or risk factors such as smoking or famhx.   - Start Duonebs q6h  - Start Hypersal nebs  - Chest PT with Aerobika device  - Pulm consult in AM

## 2023-12-11 NOTE — H&P ADULT - NSHPPHYSICALEXAM_GEN_ALL_CORE
VITAL SIGNS:  Vital Signs Last 24 Hrs  T(C): 37.1 (11 Dec 2023 02:00), Max: 37.1 (10 Dec 2023 23:43)  T(F): 98.8 (11 Dec 2023 02:00), Max: 98.8 (10 Dec 2023 23:43)  HR: 79 (11 Dec 2023 02:00) (74 - 87)  BP: 105/68 (11 Dec 2023 02:00) (104/69 - 129/74)  RR: 20 (11 Dec 2023 02:00) (18 - 20)  SpO2: 93% (11 Dec 2023 02:00) (93% - 98%)    Parameters below as of 11 Dec 2023 02:00  Patient On (Oxygen Delivery Method): room air        PHYSICAL EXAM:  Constitutional: resting comfortably; NAD  HEENT: NC/AT, PERRL, EOMI, anicteric sclera, MMM  Neck: supple; no JVD or thyromegaly; no LAD  Respiratory: CTA B/L; no W/R/R, no retractions or increased work of breathing  Cardiac: +S1/S2; RRR; no M/R/G  Gastrointestinal: soft, NT/ND; no rebound or guarding; +BS  Extremities: WWP, no clubbing or cyanosis; brisk capillary refill; no peripheral edema  Musculoskeletal: NROM x4; no joint swelling, tenderness or erythema  Vascular: 2+ radial, DP/PT pulses B/L  Dermatologic: skin warm, dry and intact; no rashes, wounds, or scars  Neurologic: AAOx3, CN II-XII intact, no focal deficits, strength 5/5 and equal in all extremities, sensation intact  Psychiatric: calm, cooperative, behaviors are appropriate, denies SI/HI VITAL SIGNS:  Vital Signs Last 24 Hrs  T(C): 37.1 (11 Dec 2023 02:00), Max: 37.1 (10 Dec 2023 23:43)  T(F): 98.8 (11 Dec 2023 02:00), Max: 98.8 (10 Dec 2023 23:43)  HR: 79 (11 Dec 2023 02:00) (74 - 87)  BP: 105/68 (11 Dec 2023 02:00) (104/69 - 129/74)  RR: 20 (11 Dec 2023 02:00) (18 - 20)  SpO2: 93% (11 Dec 2023 02:00) (93% - 98%)    Parameters below as of 11 Dec 2023 02:00  Patient On (Oxygen Delivery Method): room air        PHYSICAL EXAM:  Constitutional: elderly female resting comfortably; NAD  HEENT: NC/AT, PERRL, EOMI, anicteric sclera, MMM  Neck: supple; no JVD or thyromegaly; no LAD  Respiratory: CTA B/L; no W/R/R, no retractions or increased work of breathing  Cardiac: +S1/S2; RRR; no M/R/G  Gastrointestinal: soft, NT/ND; no rebound or guarding; +BS  Extremities: WWP, no clubbing or cyanosis; brisk capillary refill; no peripheral edema  Musculoskeletal: NROM x4; no joint swelling, tenderness or erythema  Vascular: 2+ radial, DP/PT pulses B/L  Dermatologic: skin warm, dry and intact; no rashes, wounds, or scars  Neurologic: AAOx3, no focal deficits, strength 5/5 and equal in all extremities, sensation intact  Psychiatric: calm, cooperative, behaviors are appropriate

## 2023-12-11 NOTE — H&P ADULT - ASSESSMENT
73F with no significant PMHx, coming from [place] 3 weeks ago to visit daughter, who presents with 2 weeks of cough productive with yellow-green sputum. Found to have influenza A and CT chest with complete obstruction of LLL bronchus likely 2/2 mucus plugging i/s/o superimposed bacterial PNA, with low concern for neoplasm. Admitted for further workup.

## 2023-12-11 NOTE — H&P ADULT - ATTENDING COMMENTS
#Inflenza A: p/w productive cough for 2 weeks, not improved on PO abx, RVP + influenza, c/f superimposed CAP vs post obstructive pna. F/up procal, c/w tamiflu, Ceft/azitho.      #r/o CAP vs post obstructive pna: +productive cough for two weeks. Not improved on PO abx. CTAP+ Complete obstruction of left lower lobe bronchus w/ post obstructive atelectasis vs PNA?. c/f mucus plugging or obstructing neoplasm. F/up procal. c/w Ceft 2g/azithro for now. Chest PT, dounebs, hypertonic saline. Monitor symptoms. Pulm consult in am.

## 2023-12-11 NOTE — H&P ADULT - PROBLEM SELECTOR PLAN 2
Found to have worsening symptoms with 2 weeks total of productive cough. Likely has superimposed bacterial PNA on viral PNA. No risk factors of pseudomonas, MRSA, or atypical bacteria.   - Start CTX 1g IV  - Start Azithromycin 500mg IV  - F/u procalcitonin  - F/u urine strep/legionella  - F/u sputum culture

## 2023-12-11 NOTE — H&P ADULT - HISTORY OF PRESENT ILLNESS
ED Course:  Vitals: 97.8 F oral, HR 87, /88, RR 18, 98% on RA  Labs: Na 130, Cl 92, Glu 104, trop neg, proBNP 290, Influenza A positive  EKG: NSR, TWI in V1-V3  CXR:  CTPE: Complete obstruction of LLL bronchus with postobstructive atelectasis. Findings could be due to mucous plugging, however obstructing neoplasm cannot be excluded.  Interventions: 1L NS, Vanc 1g, Zosyn 3.375mg, Duonebs x2   The patient is a 74 yo female with no significant PMHx, coming from [place] 3 weeks ago to visit daughter, who presents with 2 weeks of cough productive with yellow-green sputum. Notes progressive weakness, headaches, and decreased appetite as well. Denies fevers, chills, dizziness, lightheadedness, chest pain, palpitations, SOB, difficulty breathing, abdominal pain, N/V/D, constipation, dysuria, leg swelling, trouble ambulating. Also denies hemoptysis, dark stools, melena. Went to [facility] 1 week ago where CXR showed: hyperinflation, nodular infiltrates RLL that could represent atypical pneumonia, bronchial thickening; no focal consolidation, effusion or ptx, 5 mm AYANNA nodule. She was told to have a CAT scan upon return to [place]. She has been on Amoxicillin and Azithromycin since then. Returned to [facility] today due to persistent symptoms, and increasing fatigue and cough. Notes she is having difficulty expectorating sputum in the past day. At [facility], she reports having a fever of 102 and tested positive for influenza A.     Her daughter and grandchild had similar symptoms. Denies having decreased appetite prior to these past 2 weeks. Also denies fevers, chills, night sweats, significant weight loss, fatigue over the past few months. No pets at home. No other travel. No hx of DVT/PE, heart disease, asthma/COPD/other chronic lung disease, HTN, HLD, DM, or malignancy. No famhx of cancer. Does not take any prescription meds. Has been taking Tylenol 500mg BID for the past week for headaches.    ED Course:  Vitals: 97.8 F oral, HR 87, /88, RR 18, 98% on RA  Labs: Na 130, Cl 92, Glu 104, trop neg, proBNP 290, Influenza A positive  EKG: NSR at 77bpm, QTc 425, no ischemic changes  CTPE: Complete obstruction of LLL bronchus with postobstructive atelectasis. Findings could be due to mucous plugging, however obstructing neoplasm cannot be excluded.  Interventions: 1L NS, Vanc 1g, Zosyn 3.375mg, Duonebs x2 The patient is a 72 yo female with no significant PMHx, coming from [place] 3 weeks ago to visit daughter, who presents with 2 weeks of cough productive with yellow-green sputum. Notes progressive weakness, headaches, and decreased appetite as well. Denies fevers, chills, dizziness, lightheadedness, chest pain, palpitations, SOB, difficulty breathing, abdominal pain, N/V/D, constipation, dysuria, leg swelling, trouble ambulating. Also denies hemoptysis, dark stools, melena. Went to [facility] 1 week ago where CXR showed: hyperinflation, nodular infiltrates RLL that could represent atypical pneumonia, bronchial thickening; no focal consolidation, effusion or ptx, 5 mm AYANNA nodule. She was told to have a CAT scan upon return to [place]. She has been on Amoxicillin and Azithromycin since then. Returned to [facility] today due to persistent symptoms, and increasing fatigue and cough. Notes she is having difficulty expectorating sputum in the past day. At [facility], she reports having a fever of 102 and tested positive for influenza A.     Her daughter and grandchild had similar symptoms. Denies having decreased appetite prior to these past 2 weeks. Also denies fevers, chills, night sweats, significant weight loss, fatigue over the past few months. No pets at home. No other travel. No hx of DVT/PE, heart disease, asthma/COPD/other chronic lung disease, HTN, HLD, DM, or malignancy. No famhx of cancer. Does not take any prescription meds. Has been taking Tylenol 500mg BID for the past week for headaches.    ED Course:  Vitals: 97.8 F oral, HR 87, /88, RR 18, 98% on RA  Labs: Na 130, Cl 92, Glu 104, trop neg, proBNP 290, Influenza A positive  EKG: NSR at 77bpm, QTc 425, no ischemic changes  CTPE: Complete obstruction of LLL bronchus with postobstructive atelectasis. Findings could be due to mucous plugging, however obstructing neoplasm cannot be excluded.  Interventions: 1L NS, Vanc 1g, Zosyn 3.375mg, Duonebs x2

## 2023-12-12 VITALS
DIASTOLIC BLOOD PRESSURE: 63 MMHG | RESPIRATION RATE: 18 BRPM | TEMPERATURE: 98 F | HEART RATE: 80 BPM | SYSTOLIC BLOOD PRESSURE: 100 MMHG | OXYGEN SATURATION: 91 %

## 2023-12-12 LAB
ALBUMIN SERPL ELPH-MCNC: 3.6 G/DL — SIGNIFICANT CHANGE UP (ref 3.3–5)
ALBUMIN SERPL ELPH-MCNC: 3.6 G/DL — SIGNIFICANT CHANGE UP (ref 3.3–5)
ALP SERPL-CCNC: 75 U/L — SIGNIFICANT CHANGE UP (ref 40–120)
ALP SERPL-CCNC: 75 U/L — SIGNIFICANT CHANGE UP (ref 40–120)
ALT FLD-CCNC: 10 U/L — SIGNIFICANT CHANGE UP (ref 10–45)
ALT FLD-CCNC: 10 U/L — SIGNIFICANT CHANGE UP (ref 10–45)
ANION GAP SERPL CALC-SCNC: 10 MMOL/L — SIGNIFICANT CHANGE UP (ref 5–17)
ANION GAP SERPL CALC-SCNC: 10 MMOL/L — SIGNIFICANT CHANGE UP (ref 5–17)
AST SERPL-CCNC: 28 U/L — SIGNIFICANT CHANGE UP (ref 10–40)
AST SERPL-CCNC: 28 U/L — SIGNIFICANT CHANGE UP (ref 10–40)
BASOPHILS # BLD AUTO: 0.01 K/UL — SIGNIFICANT CHANGE UP (ref 0–0.2)
BASOPHILS # BLD AUTO: 0.01 K/UL — SIGNIFICANT CHANGE UP (ref 0–0.2)
BASOPHILS NFR BLD AUTO: 0.2 % — SIGNIFICANT CHANGE UP (ref 0–2)
BASOPHILS NFR BLD AUTO: 0.2 % — SIGNIFICANT CHANGE UP (ref 0–2)
BILIRUB SERPL-MCNC: 0.3 MG/DL — SIGNIFICANT CHANGE UP (ref 0.2–1.2)
BILIRUB SERPL-MCNC: 0.3 MG/DL — SIGNIFICANT CHANGE UP (ref 0.2–1.2)
BUN SERPL-MCNC: 7 MG/DL — SIGNIFICANT CHANGE UP (ref 7–23)
BUN SERPL-MCNC: 7 MG/DL — SIGNIFICANT CHANGE UP (ref 7–23)
CALCIUM SERPL-MCNC: 9.5 MG/DL — SIGNIFICANT CHANGE UP (ref 8.4–10.5)
CALCIUM SERPL-MCNC: 9.5 MG/DL — SIGNIFICANT CHANGE UP (ref 8.4–10.5)
CHLORIDE SERPL-SCNC: 94 MMOL/L — LOW (ref 96–108)
CHLORIDE SERPL-SCNC: 94 MMOL/L — LOW (ref 96–108)
CO2 SERPL-SCNC: 28 MMOL/L — SIGNIFICANT CHANGE UP (ref 22–31)
CO2 SERPL-SCNC: 28 MMOL/L — SIGNIFICANT CHANGE UP (ref 22–31)
CREAT SERPL-MCNC: 0.63 MG/DL — SIGNIFICANT CHANGE UP (ref 0.5–1.3)
CREAT SERPL-MCNC: 0.63 MG/DL — SIGNIFICANT CHANGE UP (ref 0.5–1.3)
EGFR: 94 ML/MIN/1.73M2 — SIGNIFICANT CHANGE UP
EGFR: 94 ML/MIN/1.73M2 — SIGNIFICANT CHANGE UP
EOSINOPHIL # BLD AUTO: 0 K/UL — SIGNIFICANT CHANGE UP (ref 0–0.5)
EOSINOPHIL # BLD AUTO: 0 K/UL — SIGNIFICANT CHANGE UP (ref 0–0.5)
EOSINOPHIL NFR BLD AUTO: 0 % — SIGNIFICANT CHANGE UP (ref 0–6)
EOSINOPHIL NFR BLD AUTO: 0 % — SIGNIFICANT CHANGE UP (ref 0–6)
GLUCOSE SERPL-MCNC: 106 MG/DL — HIGH (ref 70–99)
GLUCOSE SERPL-MCNC: 106 MG/DL — HIGH (ref 70–99)
HCT VFR BLD CALC: 38 % — SIGNIFICANT CHANGE UP (ref 34.5–45)
HCT VFR BLD CALC: 38 % — SIGNIFICANT CHANGE UP (ref 34.5–45)
HGB BLD-MCNC: 12.9 G/DL — SIGNIFICANT CHANGE UP (ref 11.5–15.5)
HGB BLD-MCNC: 12.9 G/DL — SIGNIFICANT CHANGE UP (ref 11.5–15.5)
IMM GRANULOCYTES NFR BLD AUTO: 0.2 % — SIGNIFICANT CHANGE UP (ref 0–0.9)
IMM GRANULOCYTES NFR BLD AUTO: 0.2 % — SIGNIFICANT CHANGE UP (ref 0–0.9)
LYMPHOCYTES # BLD AUTO: 1.3 K/UL — SIGNIFICANT CHANGE UP (ref 1–3.3)
LYMPHOCYTES # BLD AUTO: 1.3 K/UL — SIGNIFICANT CHANGE UP (ref 1–3.3)
LYMPHOCYTES # BLD AUTO: 22.2 % — SIGNIFICANT CHANGE UP (ref 13–44)
LYMPHOCYTES # BLD AUTO: 22.2 % — SIGNIFICANT CHANGE UP (ref 13–44)
MAGNESIUM SERPL-MCNC: 2.2 MG/DL — SIGNIFICANT CHANGE UP (ref 1.6–2.6)
MAGNESIUM SERPL-MCNC: 2.2 MG/DL — SIGNIFICANT CHANGE UP (ref 1.6–2.6)
MCHC RBC-ENTMCNC: 31.5 PG — SIGNIFICANT CHANGE UP (ref 27–34)
MCHC RBC-ENTMCNC: 31.5 PG — SIGNIFICANT CHANGE UP (ref 27–34)
MCHC RBC-ENTMCNC: 33.9 GM/DL — SIGNIFICANT CHANGE UP (ref 32–36)
MCHC RBC-ENTMCNC: 33.9 GM/DL — SIGNIFICANT CHANGE UP (ref 32–36)
MCV RBC AUTO: 92.7 FL — SIGNIFICANT CHANGE UP (ref 80–100)
MCV RBC AUTO: 92.7 FL — SIGNIFICANT CHANGE UP (ref 80–100)
MONOCYTES # BLD AUTO: 0.6 K/UL — SIGNIFICANT CHANGE UP (ref 0–0.9)
MONOCYTES # BLD AUTO: 0.6 K/UL — SIGNIFICANT CHANGE UP (ref 0–0.9)
MONOCYTES NFR BLD AUTO: 10.3 % — SIGNIFICANT CHANGE UP (ref 2–14)
MONOCYTES NFR BLD AUTO: 10.3 % — SIGNIFICANT CHANGE UP (ref 2–14)
NEUTROPHILS # BLD AUTO: 3.93 K/UL — SIGNIFICANT CHANGE UP (ref 1.8–7.4)
NEUTROPHILS # BLD AUTO: 3.93 K/UL — SIGNIFICANT CHANGE UP (ref 1.8–7.4)
NEUTROPHILS NFR BLD AUTO: 67.1 % — SIGNIFICANT CHANGE UP (ref 43–77)
NEUTROPHILS NFR BLD AUTO: 67.1 % — SIGNIFICANT CHANGE UP (ref 43–77)
NRBC # BLD: 0 /100 WBCS — SIGNIFICANT CHANGE UP (ref 0–0)
NRBC # BLD: 0 /100 WBCS — SIGNIFICANT CHANGE UP (ref 0–0)
PHOSPHATE SERPL-MCNC: 3.8 MG/DL — SIGNIFICANT CHANGE UP (ref 2.5–4.5)
PHOSPHATE SERPL-MCNC: 3.8 MG/DL — SIGNIFICANT CHANGE UP (ref 2.5–4.5)
PLATELET # BLD AUTO: 386 K/UL — SIGNIFICANT CHANGE UP (ref 150–400)
PLATELET # BLD AUTO: 386 K/UL — SIGNIFICANT CHANGE UP (ref 150–400)
POTASSIUM SERPL-MCNC: 4.1 MMOL/L — SIGNIFICANT CHANGE UP (ref 3.5–5.3)
POTASSIUM SERPL-MCNC: 4.1 MMOL/L — SIGNIFICANT CHANGE UP (ref 3.5–5.3)
POTASSIUM SERPL-SCNC: 4.1 MMOL/L — SIGNIFICANT CHANGE UP (ref 3.5–5.3)
POTASSIUM SERPL-SCNC: 4.1 MMOL/L — SIGNIFICANT CHANGE UP (ref 3.5–5.3)
PROT SERPL-MCNC: 6.9 G/DL — SIGNIFICANT CHANGE UP (ref 6–8.3)
PROT SERPL-MCNC: 6.9 G/DL — SIGNIFICANT CHANGE UP (ref 6–8.3)
RBC # BLD: 4.1 M/UL — SIGNIFICANT CHANGE UP (ref 3.8–5.2)
RBC # BLD: 4.1 M/UL — SIGNIFICANT CHANGE UP (ref 3.8–5.2)
RBC # FLD: 12.7 % — SIGNIFICANT CHANGE UP (ref 10.3–14.5)
RBC # FLD: 12.7 % — SIGNIFICANT CHANGE UP (ref 10.3–14.5)
SODIUM SERPL-SCNC: 132 MMOL/L — LOW (ref 135–145)
SODIUM SERPL-SCNC: 132 MMOL/L — LOW (ref 135–145)
WBC # BLD: 5.85 K/UL — SIGNIFICANT CHANGE UP (ref 3.8–10.5)
WBC # BLD: 5.85 K/UL — SIGNIFICANT CHANGE UP (ref 3.8–10.5)
WBC # FLD AUTO: 5.85 K/UL — SIGNIFICANT CHANGE UP (ref 3.8–10.5)
WBC # FLD AUTO: 5.85 K/UL — SIGNIFICANT CHANGE UP (ref 3.8–10.5)

## 2023-12-12 PROCEDURE — 99239 HOSP IP/OBS DSCHRG MGMT >30: CPT | Mod: GC

## 2023-12-12 PROCEDURE — 86803 HEPATITIS C AB TEST: CPT

## 2023-12-12 PROCEDURE — 36415 COLL VENOUS BLD VENIPUNCTURE: CPT

## 2023-12-12 PROCEDURE — 87040 BLOOD CULTURE FOR BACTERIA: CPT

## 2023-12-12 PROCEDURE — 83880 ASSAY OF NATRIURETIC PEPTIDE: CPT

## 2023-12-12 PROCEDURE — 85730 THROMBOPLASTIN TIME PARTIAL: CPT

## 2023-12-12 PROCEDURE — 99285 EMERGENCY DEPT VISIT HI MDM: CPT

## 2023-12-12 PROCEDURE — 0225U NFCT DS DNA&RNA 21 SARSCOV2: CPT

## 2023-12-12 PROCEDURE — 93005 ELECTROCARDIOGRAM TRACING: CPT

## 2023-12-12 PROCEDURE — 83735 ASSAY OF MAGNESIUM: CPT

## 2023-12-12 PROCEDURE — 85610 PROTHROMBIN TIME: CPT

## 2023-12-12 PROCEDURE — 94640 AIRWAY INHALATION TREATMENT: CPT

## 2023-12-12 PROCEDURE — G1004: CPT

## 2023-12-12 PROCEDURE — 84145 PROCALCITONIN (PCT): CPT

## 2023-12-12 PROCEDURE — 85379 FIBRIN DEGRADATION QUANT: CPT

## 2023-12-12 PROCEDURE — 87899 AGENT NOS ASSAY W/OPTIC: CPT

## 2023-12-12 PROCEDURE — 85025 COMPLETE CBC W/AUTO DIFF WBC: CPT

## 2023-12-12 PROCEDURE — 84132 ASSAY OF SERUM POTASSIUM: CPT

## 2023-12-12 PROCEDURE — 71275 CT ANGIOGRAPHY CHEST: CPT | Mod: ME

## 2023-12-12 PROCEDURE — 82803 BLOOD GASES ANY COMBINATION: CPT

## 2023-12-12 PROCEDURE — 80076 HEPATIC FUNCTION PANEL: CPT

## 2023-12-12 PROCEDURE — 84295 ASSAY OF SERUM SODIUM: CPT

## 2023-12-12 PROCEDURE — 71046 X-RAY EXAM CHEST 2 VIEWS: CPT

## 2023-12-12 PROCEDURE — 87637 SARSCOV2&INF A&B&RSV AMP PRB: CPT

## 2023-12-12 PROCEDURE — 84100 ASSAY OF PHOSPHORUS: CPT

## 2023-12-12 PROCEDURE — 80053 COMPREHEN METABOLIC PANEL: CPT

## 2023-12-12 PROCEDURE — 84484 ASSAY OF TROPONIN QUANT: CPT

## 2023-12-12 PROCEDURE — 80048 BASIC METABOLIC PNL TOTAL CA: CPT

## 2023-12-12 PROCEDURE — 82330 ASSAY OF CALCIUM: CPT

## 2023-12-12 RX ORDER — CEFPODOXIME PROXETIL 100 MG
1 TABLET ORAL
Qty: 0 | Refills: 0 | DISCHARGE

## 2023-12-12 RX ORDER — AZITHROMYCIN 500 MG/1
1 TABLET, FILM COATED ORAL
Qty: 0 | Refills: 0 | DISCHARGE

## 2023-12-12 RX ADMIN — Medication 75 MILLIGRAM(S): at 06:38

## 2023-12-12 RX ADMIN — CEFTRIAXONE 100 MILLIGRAM(S): 500 INJECTION, POWDER, FOR SOLUTION INTRAMUSCULAR; INTRAVENOUS at 06:38

## 2023-12-12 RX ADMIN — Medication 600 MILLIGRAM(S): at 06:38

## 2023-12-12 RX ADMIN — AZITHROMYCIN 255 MILLIGRAM(S): 500 TABLET, FILM COATED ORAL at 06:39

## 2023-12-12 RX ADMIN — SODIUM CHLORIDE 4 MILLILITER(S): 9 INJECTION INTRAMUSCULAR; INTRAVENOUS; SUBCUTANEOUS at 06:38

## 2023-12-12 RX ADMIN — Medication 3 MILLILITER(S): at 06:38

## 2023-12-12 RX ADMIN — Medication 3 MILLILITER(S): at 11:55

## 2023-12-12 RX ADMIN — ENOXAPARIN SODIUM 40 MILLIGRAM(S): 100 INJECTION SUBCUTANEOUS at 06:39

## 2023-12-12 NOTE — PROGRESS NOTE ADULT - PROBLEM SELECTOR PLAN 2
Found to have worsening symptoms with 2 weeks total of productive cough. Likely has superimposed bacterial PNA on viral PNA. No risk factors of pseudomonas, MRSA, or atypical bacteria.   - C/w CTX 1g IV  - C/w Azithromycin 500mg IV  - F/u procalcitonin  - F/u urine strep/legionella  - F/u sputum culture.
Found to have worsening symptoms with 2 weeks total of productive cough. Likely has superimposed bacterial PNA on viral PNA. No risk factors of pseudomonas, MRSA, or atypical bacteria.   - C/w CTX 1g IV  - C/w Azithromycin 500mg IV  - F/u procalcitonin  - If d/c, send home w/ Cefpodoxime x 3 days and Azithromycin x 1 day

## 2023-12-12 NOTE — DISCHARGE NOTE PROVIDER - NSDCFUADDAPPT_GEN_ALL_CORE_FT
Please follow-up with your GP in [place]. You will need repeat CT imaging of your lungs in 6 weeks.

## 2023-12-12 NOTE — DISCHARGE NOTE NURSING/CASE MANAGEMENT/SOCIAL WORK - NSDCPEFALRISK_GEN_ALL_CORE
For information on Fall & Injury Prevention, visit: [email or web address] OR  [email or web address] OR  https://www.cdc.gov/steadi/patient.html For information on Fall & Injury Prevention, visit: [email or web address] OR  [email or web address] OR  https://www.cdc.gov/steadi/patient.html

## 2023-12-12 NOTE — DISCHARGE NOTE NURSING/CASE MANAGEMENT/SOCIAL WORK - PATIENT PORTAL LINK FT
You can access the FollowMyHealth Patient Portal offered by [facility] by registering at the following website: [email or web address]. By joining [facility]’s FollowMyHealth portal, you will also be able to view your health information using other applications (apps) compatible with our system. You can access the FollowMyHealth Patient Portal offered by [facility] by registering at the following website: [email or web address]. By joining [employer]’s FollowMyHealth portal, you will also be able to view your health information using other applications (apps) compatible with our system.

## 2023-12-12 NOTE — DISCHARGE NOTE PROVIDER - NSDCCPTREATMENT_GEN_ALL_CORE_FT
PRINCIPAL PROCEDURE  Procedure: CT chest, with contrast  Findings and Treatment: PROCEDURE:  CT Angiography of the Chest.  Sagittal and coronal reformats were performed as well as 3D (MIP)   reconstructions.  FINDINGS:  LUNGS AND AIRWAYS: Diffuse peribronchial thickening. The left lower lobe   bronchus is obstructed with low density material, possibly mucus   plugging. There is resultant near complete post obstructive atelectasis   of the left lower lobe. Linear atelectasis versus focal scarring in the   right lung base.  PLEURA: No pleural effusion.  MEDIASTINUM AND GILES: No lymphadenopathy.  VESSELS: No pulmonary embolism. Normal caliber pulmonary artery. No   thoracic aortic dissection or aneurysm is identified.  HEART: Heart size is normal. No pericardial effusion.  CHEST WALL AND LOWER NECK: Within normal limits.  VISUALIZED UPPER ABDOMEN: Within normal limits.  BONES: Degenerative changes of the spine.  IMPRESSION:  1. No pulmonary embolism.  2. Complete obstruction of left lower lobe bronchus with postobstructive   atelectasis of the majority of the left lower lobe. Findings could be due   to mucous plugging, however obstructing neoplasm cannot be excluded.   Recommend CT follow-up to resolution.

## 2023-12-12 NOTE — DISCHARGE NOTE PROVIDER - NSDCCPCAREPLAN_GEN_ALL_CORE_FT
PRINCIPAL DISCHARGE DIAGNOSIS  Diagnosis: Pneumonia  Assessment and Plan of Treatment: Pneumonia is an infection that inflames the air sacs in one or both lungs. The air sacs may fill with fluid or pus (purulent material), causing cough with phlegm or pus, fever, chills, and difficulty breathing. A variety of organisms, including bacteria, viruses and fungi, can cause pneumonia.  Pneumonia can range in seriousness from mild to life-threatening. It is most serious for infants and young children, people older than age 65, and people with health problems or weakened immune systems.  Please complete your course of antibiotics as prescribed. You also had imaging of your lungs, which showed some abnormalities that need to be follow-ed up outpatient. Please follow-up with your provider in [place] for repeat CT imaging in 6 weeks.

## 2023-12-12 NOTE — PROGRESS NOTE ADULT - PROBLEM SELECTOR PLAN 5
F: s/p 1L NS  E: replete PRN  N: Regular diet  DVT ppx: Lovenox subq    Dispo: RMF.
F: s/p 1L NS  E: replete PRN  N: Regular diet  DVT ppx: Lovenox subq    Dispo: RMF.

## 2023-12-12 NOTE — PROGRESS NOTE ADULT - ATTENDING COMMENTS
Patient reports feeling much improved, cough resolved, no SOB, no chest pain. No other complaints or events reported  General: AOX3, NAD, lying in bed, speaking in full sentences, no lab  HEENT: AT/NC, no facial asymmetry   Lungs: good air movement, rare rhonchi, no wheezes  Abdomen: soft, non-tender  Extremities: warm, no edema,  no tenderness, no calf tenderness, no focal deficit     Labs reviewed    Patient with clinical improvement, maintaining 02 saturation on RA. She will be discharged today with Oseltamivir and ATB course to finish CAP coverage. Patient reports she booked a flight back to [place] tomorrow, initial plan was to fly back next week. I discussed with patient that she should try to delay her flight back to [place] ( 17 hours flight) in setting of recent admission for Flu and Bacterial PNA that she will require more time to be back to baseline to tolerate flight   Patient will need repeat CT chest in 6 weeks to r/o malignancy , she is aware and will schedule with her PCP

## 2023-12-12 NOTE — PROGRESS NOTE ADULT - PROBLEM SELECTOR PLAN 3
CT chest with diffuse peribronchial thickening, LLL bronchus is obstructed with low density material, possibly mucus plugging. Likely has obstructive PNA vs atelectasis. Possibility of obstructive neoplasm d/t long duration of s/s and CT chest results. However, pt does not have B-symptoms or risk factors such as smoking or famhx.   - C/w Duonebs q6h  - C/w Hypersal nebs  - C/w mucolytics   - Chest PT with [employer] device  - F/u chest CT in 6 weeks in [place] on d/c to r/o malignancy  - See above plan for CAP.

## 2023-12-12 NOTE — DISCHARGE NOTE PROVIDER - HOSPITAL COURSE
Hospital course by problem  SJ SANCHEZ is a 73F with no significant PMHx, coming from [place] 3 weeks ago to visit daughter, who presents with 2 weeks of productive cough with yellow-green sputum. Found to have influenza A and complete obstruction of LLL bronchus on CT chest likely 2/2 mucus plugging i/s/o superimposed bacterial on viral PNA vs. neoplasm.      #Type A influenza.   Patient with 2 weeks of cough and progressive weakness/fatigue. Daughter and grandchild at home with similar symptoms. Found to be Influenza A positive. s/p tamiflu 75 BID x 2 days  - Tamiflu 75mg BID for 3 more days  - Outpatient follow-up in [place]    #CAP (community acquired pneumonia).   Found to have worsening symptoms with 2 weeks total of productive cough. Likely has superimposed bacterial PNA on viral PNA. No risk factors of pseudomonas, MRSA, or atypical bacteria. s/p CTX 1g IV qd and azithromycin 500 mg IV QD x 2 days  - Cefpodoxime 200 mg PO BID for three days  - Azithromycin 500mg PO for 1 more day    #Abnormal CT chest  CT chest with diffuse peribronchial thickening, LLL bronchus is obstructed with low density material, possibly mucus plugging. Likely has obstructive PNA vs atelectasis. Possibility of obstructive neoplasm d/t long duration of s/s and CT chest results. However, pt does not have B-symptoms or risk factors such as smoking or famhx.   - Follow-up in [facility] outpatient for repeat imaging in 6 weeks    Patient was discharged to: Home    New medications:   - Cefpodoxime 200 mg PO BID for three days  - Azithromycin 500mg PO for 1 more day  - Tamiflu 75mg BID for 3 more days    Changes to old medications: None  Medications that were stopped: None  New Hardware: None  New DME: None    Items to follow up as outpatient:  - Follow-up in [facility] outpatient for repeat imaging in 6 weeks    Discharge physical exam:  Constitutional: NAD, comfortable in bed.  HEENT: NC/AT, PERRLA, EOMI, no conjunctival pallor or scleral icterus, MMM  Neck: Supple, no JVD  Respiratory: Decreased BS and mild rhonchi in LLL. No w/r.   Cardiovascular: RRR, normal S1 and S2, no m/r/g.   Gastrointestinal: +BS, soft NTND, no guarding or rebound tenderness, no palpable masses   Extremities: wwp; no cyanosis, clubbing or edema.   Vascular: Pulses equal and strong throughout.   Neurological: AAOx3, no CN deficits, strength and sensation intact throughout.   Skin: No gross skin abnormalities or rashes   Hospital course by problem  SJ SANCHEZ is a 73F with no significant PMHx, coming from [place] 3 weeks ago to visit daughter, who presents with 2 weeks of productive cough with yellow-green sputum. Found to have influenza A and complete obstruction of LLL bronchus on CT chest likely 2/2 mucus plugging i/s/o superimposed bacterial on viral PNA vs. neoplasm.      #Type A influenza.   Patient with 2 weeks of cough and progressive weakness/fatigue. Daughter and grandchild at home with similar symptoms. Found to be Influenza A positive. s/p tamiflu 75 BID x 2 days  - Tamiflu 75mg BID for 3 more days  - Outpatient follow-up in [place]    #CAP (community acquired pneumonia).   Found to have worsening symptoms with 2 weeks total of productive cough. Likely has superimposed bacterial PNA on viral PNA. No risk factors of pseudomonas, MRSA, or atypical bacteria. s/p CTX 1g IV qd and azithromycin 500 mg IV QD x 2 days  - Cefpodoxime 200 mg PO BID for three days  - Azithromycin 500mg PO for 1 more day    #Abnormal CT chest  CT chest with diffuse peribronchial thickening, LLL bronchus is obstructed with low density material, possibly mucus plugging. Likely has obstructive PNA vs atelectasis. Possibility of obstructive neoplasm d/t long duration of s/s and CT chest results. However, pt does not have B-symptoms or risk factors such as smoking or famhx.   - Follow-up in [facility] outpatient for repeat imaging in 6 weeks    Patient was discharged to: Home    New medications:   - Cefpodoxime 200 mg PO BID for three days  - Azithromycin 500mg PO for 1 more day  - Tamiflu 75mg BID for 3 more days    Changes to old medications: None  Medications that were stopped: None  New Hardware: None  New DME: None    Items to follow up as outpatient:  - Follow-up in [facility] outpatient for repeat imaging in 6 weeks    Discharge physical exam:  Constitutional: NAD, comfortable in bed.  HEENT: NC/AT, PERRLA, EOMI, no conjunctival pallor or scleral icterus, MMM  Neck: Supple, no JVD  Respiratory: Decreased BS and mild rhonchi in LLL. No w/r.   Cardiovascular: RRR, normal S1 and S2, no m/r/g.   Gastrointestinal: +BS, soft NTND, no guarding or rebound tenderness, no palpable masses   Extremities: wwp; no cyanosis, clubbing or edema.   Vascular: Pulses equal and strong throughout.   Neurological: AAOx3, no CN deficits, strength and sensation intact throughout.   Skin: No gross skin abnormalities or rashes

## 2023-12-12 NOTE — PROGRESS NOTE ADULT - PROBLEM SELECTOR PLAN 4
Na 130-132. Most likely euvolemic 2/2 SIADH following CAP vs. hypovolemic.  - Monitor Serum Na and UA studies  - Fluids. Mild hyponatremia, Na 130 -> 132. Most likely euvolemic 2/2 SIADH following CAP vs. hypovolemic.  - Monitor Serum Na and UA studies  - Fluids.

## 2023-12-12 NOTE — PROGRESS NOTE ADULT - PROBLEM SELECTOR PLAN 1
Patient with 2 weeks of cough and progressive weakness/fatigue. Daughter and grandchild at home with similar symptoms. Found to be Influenza A positive.   - C/w Tamiflu 75mg BID x5 days  - Tylenol PRN
Patient with 2 weeks of cough and progressive weakness/fatigue. Daughter and grandchild at home with similar symptoms. Found to be Influenza A positive.   - Tylenol PRN  - C/w Tamiflu 75mg BID x5 days  - If d/c, send home w/ Tamiflu 75mg BID x 3 days

## 2023-12-12 NOTE — PROGRESS NOTE ADULT - ASSESSMENT
73F with no significant PMHx, coming from [place] 3 weeks ago to visit daughter, who presents with 2 weeks of productive cough with yellow-green sputum. Found to have influenza A and complete obstruction of LLL bronchus on CT chest likely 2/2 mucus plugging i/s/o superimposed bacterial on viral PNA vs. neoplasm.
73F with no significant PMHx, coming from [place] 3 weeks ago to visit daughter, who presents with 2 weeks of productive cough with yellow-green sputum. Found to have influenza A and complete obstruction of LLL bronchus on CT chest likely 2/2 mucus plugging i/s/o superimposed bacterial on viral PNA vs. neoplasm.

## 2023-12-12 NOTE — DISCHARGE NOTE PROVIDER - NSDCMRMEDTOKEN_GEN_ALL_CORE_FT
azithromycin 500 mg oral tablet: 1 tab(s) orally once a day  cefpodoxime 200 mg oral tablet: 1 tab(s) orally 2 times a day

## 2023-12-12 NOTE — PROGRESS NOTE ADULT - SUBJECTIVE AND OBJECTIVE BOX
**INCOMPLETE NOTE    OVERNIGHT EVENTS: No major acute events overnight.    SUBJECTIVE: Patient seen and examined at bedside, comfortable, NAD. Pt states her congestion and SOB is still present but her cough is much improved. She states the mucolytics are helping with sputum production. Denied fever, chest pain, dyspnea, abdominal pain.     Vital Signs Last 12 Hrs  T(F): 98.4 (12-12-23 @ 06:12), Max: 98.4 (12-12-23 @ 06:12)  HR: 76 (12-12-23 @ 06:12) (76 - 76)  BP: 107/68 (12-12-23 @ 06:12) (107/68 - 107/68)  BP(mean): --  RR: 17 (12-12-23 @ 06:12) (17 - 17)  SpO2: 92% (12-12-23 @ 06:12) (92% - 92%)  I&O's Summary      PHYSICAL EXAM:  Constitutional: NAD, comfortable in bed.  HEENT: NC/AT, PERRLA, EOMI, no conjunctival pallor or scleral icterus, MMM  Neck: Supple, no JVD  Respiratory: Decreased BS in LLL. No w/r/r.   Cardiovascular: RRR, normal S1 and S2, no m/r/g.   Gastrointestinal: +BS, soft NTND, no guarding or rebound tenderness, no palpable masses   Extremities: wwp; no cyanosis, clubbing or edema.   Vascular: Pulses equal and strong throughout.   Neurological: AAOx3, no CN deficits, strength and sensation intact throughout.   Skin: No gross skin abnormalities or rashes        LABS:                        12.9   5.85  )-----------( 386      ( 12 Dec 2023 08:08 )             38.0     12-12    132<L>  |  94<L>  |  7   ----------------------------<  106<H>  4.1   |  28  |  0.63    Ca    9.5      12 Dec 2023 08:08  Phos  3.8     12-12  Mg     2.2     12-12    TPro  6.9  /  Alb  3.6  /  TBili  0.3  /  DBili  x   /  AST  28  /  ALT  10  /  AlkPhos  75  12-12    PT/INR - ( 11 Dec 2023 05:30 )   PT: 13.4 sec;   INR: 1.18          PTT - ( 11 Dec 2023 05:30 )  PTT:35.7 sec  Urinalysis Basic - ( 12 Dec 2023 08:08 )    Color: x / Appearance: x / SG: x / pH: x  Gluc: 106 mg/dL / Ketone: x  / Bili: x / Urobili: x   Blood: x / Protein: x / Nitrite: x   Leuk Esterase: x / RBC: x / WBC x   Sq Epi: x / Non Sq Epi: x / Bacteria: x          RADIOLOGY & ADDITIONAL TESTS:    MEDICATIONS  (STANDING):  albuterol/ipratropium for Nebulization 3 milliLiter(s) Nebulizer every 6 hours  azithromycin  IVPB 500 milliGRAM(s) IV Intermittent every 24 hours  cefTRIAXone   IVPB 1000 milliGRAM(s) IV Intermittent every 24 hours  enoxaparin Injectable 40 milliGRAM(s) SubCutaneous every 24 hours  guaiFENesin  milliGRAM(s) Oral every 12 hours  oseltamivir 75 milliGRAM(s) Oral two times a day  sodium chloride 7% Inhalation 4 milliLiter(s) Inhalation every 12 hours    MEDICATIONS  (PRN):  acetaminophen     Tablet .. 650 milliGRAM(s) Oral every 6 hours PRN Temp greater or equal to 38C (100.4F), Mild Pain (1 - 3)   OVERNIGHT EVENTS: No major acute events overnight.    SUBJECTIVE: Patient seen and examined at bedside, comfortable, NAD. Pt states her congestion and SOB is still present but her cough is much improved. She states the mucolytics are helping with sputum production. Denied fever, chest pain, dyspnea, abdominal pain.     Vital Signs Last 12 Hrs  T(F): 98.4 (12-12-23 @ 06:12), Max: 98.4 (12-12-23 @ 06:12)  HR: 76 (12-12-23 @ 06:12) (76 - 76)  BP: 107/68 (12-12-23 @ 06:12) (107/68 - 107/68)  BP(mean): --  RR: 17 (12-12-23 @ 06:12) (17 - 17)  SpO2: 92% (12-12-23 @ 06:12) (92% - 92%)  I&O's Summary      PHYSICAL EXAM:  Constitutional: NAD, comfortable in bed.  HEENT: NC/AT, PERRLA, EOMI, no conjunctival pallor or scleral icterus, MMM  Neck: Supple, no JVD  Respiratory: Decreased BS in LLL. No w/r/r.   Cardiovascular: RRR, normal S1 and S2, no m/r/g.   Gastrointestinal: +BS, soft NTND, no guarding or rebound tenderness, no palpable masses   Extremities: wwp; no cyanosis, clubbing or edema.   Vascular: Pulses equal and strong throughout.   Neurological: AAOx3, no CN deficits, strength and sensation intact throughout.   Skin: No gross skin abnormalities or rashes        LABS:                        12.9   5.85  )-----------( 386      ( 12 Dec 2023 08:08 )             38.0     12-12    132<L>  |  94<L>  |  7   ----------------------------<  106<H>  4.1   |  28  |  0.63    Ca    9.5      12 Dec 2023 08:08  Phos  3.8     12-12  Mg     2.2     12-12    TPro  6.9  /  Alb  3.6  /  TBili  0.3  /  DBili  x   /  AST  28  /  ALT  10  /  AlkPhos  75  12-12    PT/INR - ( 11 Dec 2023 05:30 )   PT: 13.4 sec;   INR: 1.18          PTT - ( 11 Dec 2023 05:30 )  PTT:35.7 sec  Urinalysis Basic - ( 12 Dec 2023 08:08 )    Color: x / Appearance: x / SG: x / pH: x  Gluc: 106 mg/dL / Ketone: x  / Bili: x / Urobili: x   Blood: x / Protein: x / Nitrite: x   Leuk Esterase: x / RBC: x / WBC x   Sq Epi: x / Non Sq Epi: x / Bacteria: x          RADIOLOGY & ADDITIONAL TESTS:    MEDICATIONS  (STANDING):  albuterol/ipratropium for Nebulization 3 milliLiter(s) Nebulizer every 6 hours  azithromycin  IVPB 500 milliGRAM(s) IV Intermittent every 24 hours  cefTRIAXone   IVPB 1000 milliGRAM(s) IV Intermittent every 24 hours  enoxaparin Injectable 40 milliGRAM(s) SubCutaneous every 24 hours  guaiFENesin  milliGRAM(s) Oral every 12 hours  oseltamivir 75 milliGRAM(s) Oral two times a day  sodium chloride 7% Inhalation 4 milliLiter(s) Inhalation every 12 hours    MEDICATIONS  (PRN):  acetaminophen     Tablet .. 650 milliGRAM(s) Oral every 6 hours PRN Temp greater or equal to 38C (100.4F), Mild Pain (1 - 3)

## 2023-12-15 DIAGNOSIS — J10.08 INFLUENZA DUE TO OTHER IDENTIFIED INFLUENZA VIRUS WITH OTHER SPECIFIED PNEUMONIA: ICD-10-CM

## 2023-12-15 DIAGNOSIS — E22.2 SYNDROME OF INAPPROPRIATE SECRETION OF ANTIDIURETIC HORMONE: ICD-10-CM

## 2023-12-15 DIAGNOSIS — Z11.52 ENCOUNTER FOR SCREENING FOR COVID-19: ICD-10-CM

## 2023-12-15 DIAGNOSIS — R09.02 HYPOXEMIA: ICD-10-CM

## 2023-12-15 DIAGNOSIS — J18.9 PNEUMONIA, UNSPECIFIED ORGANISM: ICD-10-CM

## 2023-12-15 DIAGNOSIS — J98.11 ATELECTASIS: ICD-10-CM

## 2023-12-15 DIAGNOSIS — J12.9 VIRAL PNEUMONIA, UNSPECIFIED: ICD-10-CM

## 2023-12-15 DIAGNOSIS — J15.9 UNSPECIFIED BACTERIAL PNEUMONIA: ICD-10-CM

## 2023-12-15 DIAGNOSIS — E86.1 HYPOVOLEMIA: ICD-10-CM

## 2023-12-16 LAB
CULTURE RESULTS: SIGNIFICANT CHANGE UP
SPECIMEN SOURCE: SIGNIFICANT CHANGE UP

## 2024-07-22 NOTE — PATIENT PROFILE ADULT - FUNCTIONAL ASSESSMENT - DAILY ACTIVITY 5.
Application Tool (Optional): Liquid Nitrogen Sprayer Render Note In Bullet Format When Appropriate: No Post-Care Instructions: I reviewed with the patient in detail post-care instructions. Patient is to wear sunprotection, and avoid picking at any of the treated lesions. Pt may apply Vaseline to crusted or scabbing areas. Duration Of Freeze Thaw-Cycle (Seconds): 5 Number Of Freeze-Thaw Cycles: 2 freeze-thaw cycles Detail Level: Detailed Show Applicator Variable?: Yes Consent: The patient's consent was obtained including but not limited to risks of crusting, scabbing, blistering, scarring, darker or lighter pigmentary change, recurrence, incomplete removal and infection. 4 = No assist / stand by assistance

## 2025-03-24 NOTE — ED ADULT TRIAGE NOTE - GLASGOW COMA SCALE: BEST VERBAL RESPONSE, MLM
Onset: 3/22/25       Location / description: Patient is having lower left abdominal pain that started two days ago. Denies weakness and chest pain. Patient stated that she is not having abdominal pain, and she is not vomiting. She said that she has had dark bowel movements in the last three days.     Precipitating Factors: unknown     Pain Scale (1-10), 10 highest: 8/10    What improves/worsens symptoms:Tylenol helps at times    Symptom specific medications: [id number]    LMP : Only if pertains to symptom. Not accessed      Are you pregnant or breast feeding: not pregnant or breastfeeding    Recent visits (last 3-4 weeks) for same reason or recent surgery:  no recent visits for the same reason and no recent surgeries      PLAN:    Go to the Emergency Department    Patient/Caller agrees to follow recommendations. and Patient/Caller encouraged to call back if any questions or concerns.         Reason for Disposition   Black or tarry bowel movements  (Exception: Chronic-unchanged black-grey BMs AND is taking iron pills or Pepto-Bismol.)    Protocols used: Abdominal Pain - Female-A-OH     (V5) oriented